# Patient Record
Sex: FEMALE | Race: WHITE | NOT HISPANIC OR LATINO
[De-identification: names, ages, dates, MRNs, and addresses within clinical notes are randomized per-mention and may not be internally consistent; named-entity substitution may affect disease eponyms.]

---

## 2022-12-02 ENCOUNTER — NON-APPOINTMENT (OUTPATIENT)
Age: 37
End: 2022-12-02

## 2022-12-02 PROBLEM — Z00.00 ENCOUNTER FOR PREVENTIVE HEALTH EXAMINATION: Status: ACTIVE | Noted: 2022-12-02

## 2022-12-13 ENCOUNTER — OUTPATIENT (OUTPATIENT)
Dept: OUTPATIENT SERVICES | Facility: HOSPITAL | Age: 37
LOS: 1 days | End: 2022-12-13
Payer: COMMERCIAL

## 2022-12-13 DIAGNOSIS — Z90.49 ACQUIRED ABSENCE OF OTHER SPECIFIED PARTS OF DIGESTIVE TRACT: Chronic | ICD-10-CM

## 2022-12-13 DIAGNOSIS — D50.9 IRON DEFICIENCY ANEMIA, UNSPECIFIED: ICD-10-CM

## 2022-12-15 ENCOUNTER — RESULT REVIEW (OUTPATIENT)
Age: 37
End: 2022-12-15

## 2022-12-15 ENCOUNTER — APPOINTMENT (OUTPATIENT)
Dept: HEMATOLOGY ONCOLOGY | Facility: CLINIC | Age: 37
End: 2022-12-15

## 2022-12-15 VITALS
TEMPERATURE: 97.5 F | HEART RATE: 95 BPM | BODY MASS INDEX: 41.02 KG/M2 | SYSTOLIC BLOOD PRESSURE: 129 MMHG | OXYGEN SATURATION: 99 % | DIASTOLIC BLOOD PRESSURE: 90 MMHG | HEIGHT: 71 IN | WEIGHT: 293 LBS

## 2022-12-15 LAB
ANISOCYTOSIS BLD QL: SLIGHT — SIGNIFICANT CHANGE UP
BASOPHILS # BLD AUTO: 0 K/UL — SIGNIFICANT CHANGE UP (ref 0–0.2)
BASOPHILS NFR BLD AUTO: 0 % — SIGNIFICANT CHANGE UP (ref 0–2)
EOSINOPHIL # BLD AUTO: 0.14 K/UL — SIGNIFICANT CHANGE UP (ref 0–0.5)
EOSINOPHIL NFR BLD AUTO: 1 % — SIGNIFICANT CHANGE UP (ref 0–6)
HCT VFR BLD CALC: 31.5 % — LOW (ref 34.5–45)
HGB BLD-MCNC: 9.2 G/DL — LOW (ref 11.5–15.5)
LG PLATELETS BLD QL AUTO: SLIGHT — SIGNIFICANT CHANGE UP
LYMPHOCYTES # BLD AUTO: 39 % — SIGNIFICANT CHANGE UP (ref 13–44)
LYMPHOCYTES # BLD AUTO: 5.32 K/UL — HIGH (ref 1–3.3)
MACROCYTES BLD QL: SLIGHT — SIGNIFICANT CHANGE UP
MCHC RBC-ENTMCNC: 20.5 PG — LOW (ref 27–34)
MCHC RBC-ENTMCNC: 29.2 GM/DL — LOW (ref 32–36)
MCV RBC AUTO: 70.2 FL — LOW (ref 80–100)
MICROCYTES BLD QL: SLIGHT — SIGNIFICANT CHANGE UP
MONOCYTES # BLD AUTO: 0.55 K/UL — SIGNIFICANT CHANGE UP (ref 0–0.9)
MONOCYTES NFR BLD AUTO: 4 % — SIGNIFICANT CHANGE UP (ref 2–14)
NEUTROPHILS # BLD AUTO: 7.63 K/UL — HIGH (ref 1.8–7.4)
NEUTROPHILS NFR BLD AUTO: 56 % — SIGNIFICANT CHANGE UP (ref 43–77)
NRBC # BLD: 0 /100 — SIGNIFICANT CHANGE UP (ref 0–0)
NRBC # BLD: SIGNIFICANT CHANGE UP /100 WBCS (ref 0–0)
PLAT MORPH BLD: NORMAL — SIGNIFICANT CHANGE UP
PLATELET # BLD AUTO: 491 K/UL — HIGH (ref 150–400)
POIKILOCYTOSIS BLD QL AUTO: SLIGHT — SIGNIFICANT CHANGE UP
POLYCHROMASIA BLD QL SMEAR: SLIGHT — SIGNIFICANT CHANGE UP
RBC # BLD: 4.49 M/UL — SIGNIFICANT CHANGE UP (ref 3.8–5.2)
RBC # FLD: 16.6 % — HIGH (ref 10.3–14.5)
RBC BLD AUTO: SIGNIFICANT CHANGE UP
WBC # BLD: 13.63 K/UL — HIGH (ref 3.8–10.5)
WBC # FLD AUTO: 13.63 K/UL — HIGH (ref 3.8–10.5)

## 2022-12-15 PROCEDURE — 99204 OFFICE O/P NEW MOD 45 MIN: CPT

## 2022-12-15 RX ORDER — VENLAFAXINE HYDROCHLORIDE 150 MG/1
150 CAPSULE, EXTENDED RELEASE ORAL
Refills: 0 | Status: ACTIVE | COMMUNITY

## 2022-12-15 RX ORDER — OMEPRAZOLE 40 MG/1
40 CAPSULE, DELAYED RELEASE ORAL
Refills: 0 | Status: ACTIVE | COMMUNITY

## 2022-12-15 RX ORDER — ARIPIPRAZOLE 20 MG/1
20 TABLET ORAL
Refills: 0 | Status: ACTIVE | COMMUNITY

## 2022-12-15 RX ORDER — BUPROPION HYDROCHLORIDE 150 MG/1
150 TABLET, FILM COATED, EXTENDED RELEASE ORAL
Refills: 0 | Status: ACTIVE | COMMUNITY

## 2022-12-15 NOTE — RESULTS/DATA
[FreeTextEntry1] : Ms. Arroyo presented at age 37 in December 2022 for evaluation of iron deficiency anemia. \par The patient has a medical history of HTN, GERD, anxiety/depression (on effexor, abilify and wellbutrin). \par \par Management of MARILIN as below. Proceed with IV iron. \par Check pregnancy test.

## 2022-12-15 NOTE — HISTORY OF PRESENT ILLNESS
[de-identified] : Referred by: Dr. Kellie Dacosta \par \par Ms. Arroyo presented at age 37 in December 2022 for evaluation of iron deficiency anemia. \par The patient has a medical history of HTN, GERD, anxiety/depression (on effexor, abilify and wellbutrin). \par \par She was seen by her PCP on 11/18/22 and at that time lab studies were consistent with microcytic anemia and significant iron deficiency- ferritin 9. She eats a normal diet but caters to her  who is a picky eater. She does have meat in her diet. She reports feeling very exhausted lately, +dizziness and lightheaded, feels presyncopal at times. She is trying to get pregnant- undergoing IUI. Has never taken oral iron supplement. She reports regular menses, very heavy, uses a menstrual cup 4-5 x per day. Lasts 4-6 days. Has not seen gynecology recently. Denies pica symptoms. She denies fevers, chills, CP, SOB, n/v/d, hematuria, weight loss, lymphadenopathy. She reports occasional left lower abdominal cramping. \par \par Laboratory studies from 11/18/22 reviewed and notable for: WBC 11.56, Hb 9.8 (MCV 71.2), plt 505 \par Ferritin 9, iron 20, TIBC 346, Tsat 6% c/w MARILIN\par TSH 1.63\par \par HCM: \par - COVID vaccination: s/p 2 doses, 1 booster \par - Colonoscopy: done 2018, normal per patient report, also had EGD \par - Gyn: last done 2019, DUE \par - Mammo: done 2019- normal \par \par SH: \par - Occupation: she is currently working as a marketing/events coordinator\par - Living situation: lives in [facility] with her , no prior pregnancies \par - Smoking/etoh/illicits: never smoker, social etoh, denies illicits \par - Exercise: not very physically active \par \par FH: \par - Her mother had anemia \par - Her paternal GF and GM both had cancer- esophageal and stomach (both smokers)

## 2022-12-15 NOTE — CONSULT LETTER
[Dear  ___] : Dear  [unfilled], [Consult Letter:] : I had the pleasure of evaluating your patient, [unfilled]. [Please see my note below.] : Please see my note below. [Consult Closing:] : Thank you very much for allowing me to participate in the care of this patient.  If you have any questions, please do not hesitate to contact me. [Sincerely,] : Sincerely, [FreeTextEntry2] : Dr. Kellie Dacosta  [FreeTextEntry3] : Dr. An Hoffman\par

## 2022-12-15 NOTE — PHYSICAL EXAM
[Fully active, able to carry on all pre-disease performance without restriction] : Status 0 - Fully active, able to carry on all pre-disease performance without restriction [Obese] : obese [Normal] : affect appropriate [de-identified] : generally well appearing female, NAD, pleasant

## 2022-12-16 ENCOUNTER — NON-APPOINTMENT (OUTPATIENT)
Age: 37
End: 2022-12-16

## 2022-12-16 LAB
CRP SERPL-MCNC: 8 MG/L
ERYTHROCYTE [SEDIMENTATION RATE] IN BLOOD BY WESTERGREN METHOD: 61 MM/HR
FERRITIN SERPL-MCNC: 9 NG/ML
FOLATE SERPL-MCNC: 8.4 NG/ML
HCG SERPL-MCNC: <1 MIU/ML
IRON SATN MFR SERPL: 5 %
IRON SERPL-MCNC: 21 UG/DL
TIBC SERPL-MCNC: 417 UG/DL
UIBC SERPL-MCNC: 396 UG/DL
VIT B12 SERPL-MCNC: 347 PG/ML

## 2022-12-19 ENCOUNTER — APPOINTMENT (OUTPATIENT)
Dept: INFUSION THERAPY | Facility: CANCER CENTER | Age: 37
End: 2022-12-19

## 2022-12-20 DIAGNOSIS — E61.1 IRON DEFICIENCY: ICD-10-CM

## 2022-12-27 ENCOUNTER — APPOINTMENT (OUTPATIENT)
Dept: INFUSION THERAPY | Facility: CANCER CENTER | Age: 37
End: 2022-12-27

## 2023-02-09 ENCOUNTER — RESULT REVIEW (OUTPATIENT)
Age: 38
End: 2023-02-09

## 2023-02-09 ENCOUNTER — APPOINTMENT (OUTPATIENT)
Dept: HEMATOLOGY ONCOLOGY | Facility: CLINIC | Age: 38
End: 2023-02-09
Payer: COMMERCIAL

## 2023-02-09 VITALS
TEMPERATURE: 97.2 F | OXYGEN SATURATION: 99 % | WEIGHT: 293 LBS | SYSTOLIC BLOOD PRESSURE: 131 MMHG | HEART RATE: 72 BPM | DIASTOLIC BLOOD PRESSURE: 89 MMHG | BODY MASS INDEX: 43.93 KG/M2

## 2023-02-09 LAB
BASOPHILS # BLD AUTO: 0.05 K/UL — SIGNIFICANT CHANGE UP (ref 0–0.2)
BASOPHILS NFR BLD AUTO: 0.4 % — SIGNIFICANT CHANGE UP (ref 0–2)
EOSINOPHIL # BLD AUTO: 0.11 K/UL — SIGNIFICANT CHANGE UP (ref 0–0.5)
EOSINOPHIL NFR BLD AUTO: 0.9 % — SIGNIFICANT CHANGE UP (ref 0–6)
HCT VFR BLD CALC: 40 % — SIGNIFICANT CHANGE UP (ref 34.5–45)
HGB BLD-MCNC: 13.1 G/DL — SIGNIFICANT CHANGE UP (ref 11.5–15.5)
IMM GRANULOCYTES NFR BLD AUTO: 0.3 % — SIGNIFICANT CHANGE UP (ref 0–0.9)
LYMPHOCYTES # BLD AUTO: 29.7 % — SIGNIFICANT CHANGE UP (ref 13–44)
LYMPHOCYTES # BLD AUTO: 3.7 K/UL — HIGH (ref 1–3.3)
MCHC RBC-ENTMCNC: 25.9 PG — LOW (ref 27–34)
MCHC RBC-ENTMCNC: 32.8 GM/DL — SIGNIFICANT CHANGE UP (ref 32–36)
MCV RBC AUTO: 79.2 FL — LOW (ref 80–100)
MONOCYTES # BLD AUTO: 0.93 K/UL — HIGH (ref 0–0.9)
MONOCYTES NFR BLD AUTO: 7.5 % — SIGNIFICANT CHANGE UP (ref 2–14)
NEUTROPHILS # BLD AUTO: 7.64 K/UL — HIGH (ref 1.8–7.4)
NEUTROPHILS NFR BLD AUTO: 61.2 % — SIGNIFICANT CHANGE UP (ref 43–77)
NRBC # BLD: 0 /100 WBCS — SIGNIFICANT CHANGE UP (ref 0–0)
PLATELET # BLD AUTO: 436 K/UL — HIGH (ref 150–400)
RBC # BLD: 5.05 M/UL — SIGNIFICANT CHANGE UP (ref 3.8–5.2)
RBC # FLD: 23.4 % — HIGH (ref 10.3–14.5)
WBC # BLD: 12.47 K/UL — HIGH (ref 3.8–10.5)
WBC # FLD AUTO: 12.47 K/UL — HIGH (ref 3.8–10.5)

## 2023-02-09 PROCEDURE — 96374 THER/PROPH/DIAG INJ IV PUSH: CPT

## 2023-02-09 PROCEDURE — 99214 OFFICE O/P EST MOD 30 MIN: CPT

## 2023-02-09 PROCEDURE — 85027 COMPLETE CBC AUTOMATED: CPT

## 2023-02-09 NOTE — HISTORY OF PRESENT ILLNESS
[de-identified] : Referred by: Dr. Kellie Dacosta \par \par Ms. Arroyo initially presented at age 37 in December 2022 for evaluation of iron deficiency anemia. \par The patient has a medical history of HTN, GERD, anxiety/depression (on effexor, abilify and wellbutrin). \par \par She was seen by her PCP on 11/18/22 and at that time lab studies were consistent with microcytic anemia and significant iron deficiency- ferritin 9. She reported a normal diet but caters to her  who is a picky eater. She does have meat in her diet. She reported feeling very exhausted lately, +dizziness and lightheaded, feels presyncopal at times. She is trying to get pregnant- undergoing IUI. Has never taken oral iron supplement. She reported regular menses, very heavy, uses a menstrual cup 4-5 x per day. Lasts 4-6 days. Has not seen gynecology recently. Denies pica symptoms. She denied fevers, chills, CP, SOB, n/v/d, hematuria, weight loss, lymphadenopathy. She reports occasional left lower abdominal cramping. \par \par Laboratory studies from 11/18/22 reviewed and notable for: WBC 11.56, Hb 9.8 (MCV 71.2), plt 505 \par Ferritin 9, iron 20, TIBC 346, Tsat 6% c/w MARILIN\par TSH 1.63\par \par HCM: \par - COVID vaccination: s/p 2 doses, 1 booster \par - Colonoscopy: done 2018, normal per patient report, also had EGD \par - Gyn: last done 2019, DUE \par - Mammo: done 2019- normal \par \par SH: \par - Occupation: she is currently working as a marketing/events coordinator\par - Living situation: lives in [place] with her , no prior pregnancies \par - Smoking/etoh/illicits: never smoker, social etoh, denies illicits \par - Exercise: not very physically active \par \par FH: \par - Her mother had anemia \par - Her paternal GF and GM both had cancer- esophageal and stomach (both smokers)  [de-identified] : Presents for follow up visit 2/9/23 or iron deficiency anemia.  New to writer.\par Received Feraheme 12/19 & 12/27/22\par \par At today's visit she reports feeling well overall.  Received IV iron in 12/22 with good tolerance.  Energy level improved.  No unusual bleeding or bruising noted.  Saw rheumatology (at [place]) as requested.  Was advised inflammatory markers likely elevated d/t recent Covid booster and will be following up soon to re-assess.  Continues to desire conception - failed IUI.  Must undergo weight loss in order to pursue IVF.  She has an appointment to discuss medically supervised weight loss on 3/9.  Menses remain heavy at times.  Saw Gyn after last visit, TVUS reportedly normal.\par \par Labs today reviewed and significant for:  WBC 12.47, Hgb 13.1, plt 436

## 2023-02-09 NOTE — RESULTS/DATA
[FreeTextEntry1] : Ms. Arroyo initially presented at age 37 in December 2022 for evaluation of iron deficiency anemia. \par The patient has a medical history of HTN, GERD, anxiety/depression (on effexor, abilify and wellbutrin). \par \par Received IV Feraheme x 2 doses in 12/22 with good tolerance\par Hgb normalized\par Will continue to monitor periodically

## 2023-02-09 NOTE — PHYSICAL EXAM
[Fully active, able to carry on all pre-disease performance without restriction] : Status 0 - Fully active, able to carry on all pre-disease performance without restriction [Obese] : obese [Normal] : affect appropriate [de-identified] : generally well appearing female, NAD, pleasant/cooperative

## 2023-02-10 LAB
FERRITIN SERPL-MCNC: 41 NG/ML
FOLATE SERPL-MCNC: 7.7 NG/ML
HCG SERPL-MCNC: <1 MIU/ML
IRON SATN MFR SERPL: 13 %
IRON SERPL-MCNC: 45 UG/DL
TIBC SERPL-MCNC: 345 UG/DL
UIBC SERPL-MCNC: 300 UG/DL
VIT B12 SERPL-MCNC: 393 PG/ML

## 2023-05-10 DIAGNOSIS — D50.9 IRON DEFICIENCY ANEMIA, UNSPECIFIED: ICD-10-CM

## 2023-05-10 NOTE — HISTORY OF PRESENT ILLNESS
[de-identified] : Referred by: Dr. Kellie Dacosta \par \par Ms. Arroyo presented at age 37 in December 2022 for evaluation of iron deficiency anemia. \par The patient has a medical history of HTN, GERD, anxiety/depression (on effexor, abilify and wellbutrin). \par \par She was seen by her PCP on 11/18/22 and at that time lab studies were consistent with microcytic anemia and significant iron deficiency- ferritin 9. She eats a normal diet but caters to her  who is a picky eater. She does have meat in her diet. She reports feeling very exhausted lately, +dizziness and lightheaded, feels presyncopal at times. She is trying to get pregnant- undergoing IUI. Has never taken oral iron supplement. She reports regular menses, very heavy, uses a menstrual cup 4-5 x per day. Lasts 4-6 days. Has not seen gynecology recently. Denies pica symptoms. She denies fevers, chills, CP, SOB, n/v/d, hematuria, weight loss, lymphadenopathy. She reports occasional left lower abdominal cramping. \par \par Laboratory studies from 11/18/22 reviewed and notable for: WBC 11.56, Hb 9.8 (MCV 71.2), plt 505 \par Ferritin 9, iron 20, TIBC 346, Tsat 6% c/w MARILIN\par TSH 1.63\par \par HCM: \par - COVID vaccination: s/p 2 doses, 1 booster \par - Colonoscopy: done 2018, normal per patient report, also had EGD \par - Gyn: last done 2019, DUE \par - Mammo: done 2019- normal \par \par SH: \par - Occupation: she is currently working as a marketing/events coordinator\par - Living situation: lives in [facility] with her , no prior pregnancies \par - Smoking/etoh/illicits: never smoker, social etoh, denies illicits \par - Exercise: not very physically active \par \par FH: \par - Her mother had anemia \par - Her paternal GF and GM both had cancer- esophageal and stomach (both smokers)  [de-identified] : Interval History: Presents for follow up visit 5/12/23 for iron deficiency anemia. \par Received Feraheme 12/19 & 12/27/22\par \par At today's visit she reports feeling well overall. Received IV iron in 12/22 with good tolerance. Energy level improved. No unusual bleeding or bruising noted. Saw rheumatology (at [place]) as requested. Was advised inflammatory markers likely elevated d/t recent Covid booster and will be following up soon to re-assess. Continues to desire conception - failed IUI. Must undergo weight loss in order to pursue IVF. She has an appointment to discuss medically supervised weight loss on 3/9. Menses remain heavy at times. Saw Gyn after last visit, TVUS reportedly normal.\par \par Laboratory studies reviewed at today's visit and notable for:

## 2023-05-11 ENCOUNTER — NON-APPOINTMENT (OUTPATIENT)
Age: 38
End: 2023-05-11

## 2023-05-12 ENCOUNTER — APPOINTMENT (OUTPATIENT)
Dept: HEMATOLOGY ONCOLOGY | Facility: CLINIC | Age: 38
End: 2023-05-12